# Patient Record
Sex: MALE | Race: WHITE | Employment: OTHER | URBAN - METROPOLITAN AREA
[De-identification: names, ages, dates, MRNs, and addresses within clinical notes are randomized per-mention and may not be internally consistent; named-entity substitution may affect disease eponyms.]

---

## 2019-01-23 ENCOUNTER — OFFICE VISIT (OUTPATIENT)
Dept: FAMILY MEDICINE | Facility: CLINIC | Age: 59
End: 2019-01-23
Payer: COMMERCIAL

## 2019-01-23 VITALS
WEIGHT: 262 LBS | HEART RATE: 81 BPM | BODY MASS INDEX: 35.78 KG/M2 | TEMPERATURE: 98.2 F | SYSTOLIC BLOOD PRESSURE: 137 MMHG | DIASTOLIC BLOOD PRESSURE: 78 MMHG | OXYGEN SATURATION: 97 %

## 2019-01-23 DIAGNOSIS — J22 LOWER RESPIRATORY INFECTION: Primary | ICD-10-CM

## 2019-01-23 PROBLEM — E66.01 MORBID OBESITY (H): Status: ACTIVE | Noted: 2019-01-23

## 2019-01-23 PROCEDURE — 99213 OFFICE O/P EST LOW 20 MIN: CPT | Performed by: FAMILY MEDICINE

## 2019-01-23 RX ORDER — AZITHROMYCIN 250 MG/1
TABLET, FILM COATED ORAL
Qty: 6 TABLET | Refills: 0 | Status: SHIPPED | OUTPATIENT
Start: 2019-01-23

## 2019-01-23 NOTE — PROGRESS NOTES
SUBJECTIVE:   Alejandro Merrill is a 58 year old male who presents to clinic today for the following health issues:      RESPIRATORY SYMPTOMS      Duration: couple weeks now    Description  cough and congested    Severity: cough all night but morning it is ok    Accompanying signs and symptoms: None    History (predisposing factors):  exposure to flu grandkids has it    Precipitating or alleviating factors: None    Therapies tried and outcome:  rest and fluids oral decongestant    No known kidney or liver problems    Patient has had a minor cold couple of weeks ago didn't think much of it.   Then patient went on a cruise with grandkids  On the cruise one of the grandkids had strep throat  Patient has been taking care of the one with strep throat  Another one was diagnosed with an ear infection/pink eye and possible pneumonia  Then another one had high fever and vomiting. Was diagnosed with the flu or no pneumonia just influenza    Patient himself has had his cough progressively worsened the past week   Colds, sinus congestion, facial pain  Cough Yes  Greenish discharge  Fever No  Progressively getting worse: YES  Thought was getting better then started getting worse: YES  Getting better:  No  Exposure to pertussis or pertussis like symptoms: No    Problem list and histories reviewed & adjusted, as indicated.  Additional history: as documented    Problem list, Medication list, Allergies, and Medical/Social/Surgical histories reviewed in [facility] and updated as appropriate.    ROS:  Constitutional, HEENT, cardiovascular, pulmonary, gi and gu systems are negative, except as otherwise noted.    OBJECTIVE:                                                    /78   Pulse 81   Temp 98.2  F (36.8  C) (Tympanic)   Wt 262 lb (118.8 kg)   SpO2 97%   BMI 35.78 kg/m    Body mass index is 35.78 kg/m .  GENERAL: healthy, alert and no distress  EYES: Eyes grossly normal to inspection, PERRL and conjunctivae and sclerae  normal  HENT: ear canals and TM's normal, nose and mouth without ulcers or lesions  Sinuses: turbinates erythematous   NECK: no adenopathy, no asymmetry, masses, or scars and thyroid normal to palpation  RESP: lungs clear to auscultation - no rales, rhonchi or wheezes   CV: regular rate and rhythm, normal S1 S2, no S3 or S4, no murmur, click or rub, no peripheral edema and peripheral pulses strong  ABDOMEN: soft, nontender, no hepatosplenomegaly, no masses and bowel sounds normal  MS: no gross musculoskeletal defects noted, no edema  SKIN: no suspicious lesions or rashes  NEURO: Normal strength and tone, mentation intact and speech normal  PSYCH: mentation appears normal, affect normal/bright    Diagnostic Test Results:  No results found for this or any previous visit (from the past 24 hour(s)).     ASSESSMENT/PLAN:                                                        ICD-10-CM    1. Lower respiratory infection J22 azithromycin (ZITHROMAX) 250 MG tablet     Patient given a prescription for zithromax. Side effects of antibiotic discussed. Aware of small but statistically significant increase cardiovascular risk with antibiotic.    Recommend follow up with primary care provider if no relief , sooner if worse  Adverse reactions of medications discussed.  Over the counter medications discussed.   Aware to come back in if with worsening symptoms or if no relief despite treatment plan  Patient voiced understanding and had no further questions.     MD Vilma Nuñez MD  [facility]

## 2019-12-31 ENCOUNTER — DOCUMENTATION ONLY (OUTPATIENT)
Dept: OPHTHALMOLOGY | Facility: CLINIC | Age: 59
End: 2019-12-31